# Patient Record
Sex: FEMALE | Race: WHITE | Employment: FULL TIME | URBAN - METROPOLITAN AREA
[De-identification: names, ages, dates, MRNs, and addresses within clinical notes are randomized per-mention and may not be internally consistent; named-entity substitution may affect disease eponyms.]

---

## 2017-02-10 ENCOUNTER — TELEPHONE (OUTPATIENT)
Dept: SURGERY | Facility: CLINIC | Age: 22
End: 2017-02-10

## 2017-02-10 NOTE — TELEPHONE ENCOUNTER
Established Patient Telephone Reminder Call    Date of call:  02/10/2017  Phone numbers:  Home number on file [phone] (home)    Reached patient/confirmed appointment:  No - left message:   on voicemail  Appointment with:   Dr. Charles Persaud  Reason for visit:  Post op

## 2017-02-13 ENCOUNTER — OFFICE VISIT (OUTPATIENT)
Dept: SURGERY | Facility: CLINIC | Age: 22
End: 2017-02-13

## 2017-02-13 VITALS
HEIGHT: 69 IN | TEMPERATURE: 98.7 F | DIASTOLIC BLOOD PRESSURE: 63 MMHG | OXYGEN SATURATION: 99 % | SYSTOLIC BLOOD PRESSURE: 109 MMHG | WEIGHT: 135.1 LBS | BODY MASS INDEX: 20.01 KG/M2

## 2017-02-13 DIAGNOSIS — Z09 SURGICAL FOLLOWUP: Primary | ICD-10-CM

## 2017-02-13 ASSESSMENT — PAIN SCALES - GENERAL: PAINLEVEL: NO PAIN (0)

## 2017-02-13 NOTE — MR AVS SNAPSHOT
"              After Visit Summary   2017    Antoinette Hazel    MRN: [id number]           Patient Information     Date Of Birth          1995        Visit Information        Provider Department      2017 10:00 AM Charles Persaud MD [facility]        Today's Diagnoses     Surgical followup    -  1       Follow-ups after your visit        Who to contact     Please call your clinic at [phone] to:    Ask questions about your health    Make or cancel appointments    Discuss your medicines    Learn about your test results    Speak to your doctor   If you have compliments or concerns about an experience at your clinic, or if you wish to file a complaint, please contact [facility] Patient Relations at [phone] or email us at [email or web address]         Additional Information About Your Visit        MyChart Information     RingCaptcha is an electronic gateway that provides easy, online access to your medical records. With RingCaptcha, you can request a clinic appointment, read your test results, renew a prescription or communicate with your care team.     To sign up for [employer] visit the website at [email or web address]   You will be asked to enter the access code listed below, as well as some personal information. Please follow the directions to create your username and password.     Your access code is: [email or web address]  Expires: 2017 10:19 AM     Your access code will  in 90 days. If you need help or a new code, please contact your [facility] or call [phone] for assistance.        Care EveryWhere ID     This is your Care EveryWhere ID. This could be used by other organizations to access your [place] medical records  [id number]        Your Vitals Were     Temperature Height Pulse Oximetry BMI (Body Mass Index)          98.7  F (37.1  C) 1.753 m (5' 9\") 99% 19.95 kg/m2         Blood Pressure " from Last 3 Encounters:   02/13/17 109/63   11/23/16 115/75   11/10/16 110/63    Weight from Last 3 Encounters:   02/13/17 61.3 kg (135 lb 1.6 oz)   11/23/16 63 kg (138 lb 12.8 oz)   11/10/16 62.5 kg (137 lb 12.8 oz)              Today, you had the following     No orders found for display       Primary Care Provider Office Phone # Fax #    Sharla Martines [phone]       [facility] [address] [facility] [address]        Thank you!     Thank you for choosing [facility]  for your care. Our goal is always to provide you with excellent care. Hearing back from our patients is one way we can continue to improve our services. Please take a few minutes to complete the written survey that you may receive in the mail after your visit with us. Thank you!             Your Updated Medication List - Protect others around you: Learn how to safely use, store and throw away your medicines at www.disposemymeds.org.          This list is accurate as of: 2/13/17 10:19 AM.  Always use your most recent med list.                   Brand Name Dispense Instructions for use    IBUPROFEN PO      Take 400 mg by mouth every 6 hours as needed for moderate pain       levonorgestrel-ethinyl estradiol 0.1-20 MG-MCG per tablet    YAYA SWANN LESSINA     Take 1 tablet by mouth daily       loratadine 10 MG tablet    CLARITIN     Take 10 mg by mouth daily as needed for allergies       oxyCODONE-acetaminophen 5-325 MG per tablet    PERCOCET    20 tablet    Take 1 tablet by mouth every 4 hours as needed for pain Maximum 8 tablet(s) per day       SYNTHROID PO      Take 50 mcg by mouth daily

## 2017-02-13 NOTE — Clinical Note
2/13/2017       RE: Antoinette Hazel  [address]  [address]  [place] [facility] [address]     Dear Colleague,    Thank you for referring your patient, Antoinette Hazel, to the [facility] at [facility]. Please see a copy of my visit note below.    No notes on file    Again, thank you for allowing me to participate in the care of your patient.      Sincerely,    Charles Persaud MD

## 2017-02-13 NOTE — PROGRESS NOTES
Patient here for f/u.  Doing well overall.  Wound has long healed.  She came as she thought that she had to come.  Last visit her wound had not completely healed.    As it has completely healed, there is no further need for f/u.  Questions answered.  F/u prn.

## 2017-02-13 NOTE — LETTER
2/13/2017      RE: Antoinette Hazel  [address]  [facility] [address]       Patient here for f/u.  Doing well overall.  Wound has long healed.  She came as she thought that she had to come.  Last visit her wound had not completely healed.    As it has completely healed, there is no further need for f/u.  Questions answered.  F/u prn.    Charles Persaud MD

## 2017-02-13 NOTE — NURSING NOTE
"Chief Complaint   Patient presents with     RECHECK     f/u       Vitals:    02/13/17 1007   BP: 109/63   BP Location: Left arm   Temp: 98.7  F (37.1  C)   SpO2: 99%   Weight: 135 lb 1.6 oz   Height: 5' 9\"       Body mass index is 19.95 kg/(m^2).      Mike Simpson MA                          "